# Patient Record
Sex: MALE | Race: BLACK OR AFRICAN AMERICAN | NOT HISPANIC OR LATINO | Employment: OTHER | URBAN - METROPOLITAN AREA
[De-identification: names, ages, dates, MRNs, and addresses within clinical notes are randomized per-mention and may not be internally consistent; named-entity substitution may affect disease eponyms.]

---

## 2018-08-22 ENCOUNTER — HOSPITAL ENCOUNTER (INPATIENT)
Facility: HOSPITAL | Age: 53
LOS: 1 days | Discharge: HOME | End: 2018-08-24
Attending: EMERGENCY MEDICINE | Admitting: HOSPITALIST

## 2018-08-22 DIAGNOSIS — L03.116 CELLULITIS OF LEFT LEG: Primary | ICD-10-CM

## 2018-08-22 LAB
BASOPHILS # BLD AUTO: 0.01 10*3/MM3 (ref 0–0.2)
BASOPHILS NFR BLD AUTO: 0.1 % (ref 0–1.5)
D-LACTATE SERPL-SCNC: 1.1 MMOL/L (ref 0.5–2)
DEPRECATED RDW RBC AUTO: 41.1 FL (ref 37–54)
EOSINOPHIL # BLD AUTO: 0.04 10*3/MM3 (ref 0–0.7)
EOSINOPHIL NFR BLD AUTO: 0.3 % (ref 0.3–6.2)
ERYTHROCYTE [DISTWIDTH] IN BLOOD BY AUTOMATED COUNT: 13.6 % (ref 11.5–14.5)
HCT VFR BLD AUTO: 43.1 % (ref 40.4–52.2)
HGB BLD-MCNC: 14.5 G/DL (ref 13.7–17.6)
IMM GRANULOCYTES # BLD: 0.02 10*3/MM3 (ref 0–0.03)
IMM GRANULOCYTES NFR BLD: 0.2 % (ref 0–0.5)
LYMPHOCYTES # BLD AUTO: 1.35 10*3/MM3 (ref 0.9–4.8)
LYMPHOCYTES NFR BLD AUTO: 10.6 % (ref 19.6–45.3)
MCH RBC QN AUTO: 27.7 PG (ref 27–32.7)
MCHC RBC AUTO-ENTMCNC: 33.6 G/DL (ref 32.6–36.4)
MCV RBC AUTO: 82.3 FL (ref 79.8–96.2)
MONOCYTES # BLD AUTO: 0.59 10*3/MM3 (ref 0.2–1.2)
MONOCYTES NFR BLD AUTO: 4.6 % (ref 5–12)
NEUTROPHILS # BLD AUTO: 10.77 10*3/MM3 (ref 1.9–8.1)
NEUTROPHILS NFR BLD AUTO: 84.4 % (ref 42.7–76)
PLATELET # BLD AUTO: 188 10*3/MM3 (ref 140–500)
PMV BLD AUTO: 10.1 FL (ref 6–12)
RBC # BLD AUTO: 5.24 10*6/MM3 (ref 4.6–6)
WBC NRBC COR # BLD: 12.76 10*3/MM3 (ref 4.5–10.7)

## 2018-08-22 PROCEDURE — 85652 RBC SED RATE AUTOMATED: CPT | Performed by: EMERGENCY MEDICINE

## 2018-08-22 PROCEDURE — 36415 COLL VENOUS BLD VENIPUNCTURE: CPT

## 2018-08-22 PROCEDURE — 83036 HEMOGLOBIN GLYCOSYLATED A1C: CPT | Performed by: INTERNAL MEDICINE

## 2018-08-22 PROCEDURE — 25010000002 VANCOMYCIN 10 G RECONSTITUTED SOLUTION: Performed by: EMERGENCY MEDICINE

## 2018-08-22 PROCEDURE — 87040 BLOOD CULTURE FOR BACTERIA: CPT | Performed by: EMERGENCY MEDICINE

## 2018-08-22 PROCEDURE — 99284 EMERGENCY DEPT VISIT MOD MDM: CPT

## 2018-08-22 PROCEDURE — 85025 COMPLETE CBC W/AUTO DIFF WBC: CPT | Performed by: EMERGENCY MEDICINE

## 2018-08-22 PROCEDURE — 25010000002 CEFTRIAXONE PER 250 MG: Performed by: EMERGENCY MEDICINE

## 2018-08-22 PROCEDURE — 86140 C-REACTIVE PROTEIN: CPT | Performed by: EMERGENCY MEDICINE

## 2018-08-22 PROCEDURE — 84550 ASSAY OF BLOOD/URIC ACID: CPT | Performed by: EMERGENCY MEDICINE

## 2018-08-22 PROCEDURE — 83605 ASSAY OF LACTIC ACID: CPT | Performed by: EMERGENCY MEDICINE

## 2018-08-22 PROCEDURE — 80053 COMPREHEN METABOLIC PANEL: CPT | Performed by: EMERGENCY MEDICINE

## 2018-08-22 RX ORDER — HYDROCHLOROTHIAZIDE 12.5 MG/1
12.5 CAPSULE, GELATIN COATED ORAL DAILY
COMMUNITY
End: 2018-08-24 | Stop reason: HOSPADM

## 2018-08-22 RX ORDER — ACETAMINOPHEN 500 MG
1000 TABLET ORAL ONCE
Status: COMPLETED | OUTPATIENT
Start: 2018-08-22 | End: 2018-08-22

## 2018-08-22 RX ORDER — AMLODIPINE BESYLATE 10 MG/1
10 TABLET ORAL DAILY
COMMUNITY

## 2018-08-22 RX ORDER — OMEPRAZOLE 20 MG/1
20 CAPSULE, DELAYED RELEASE ORAL DAILY
COMMUNITY

## 2018-08-22 RX ORDER — POTASSIUM CHLORIDE 750 MG/1
10 CAPSULE, EXTENDED RELEASE ORAL ONCE
COMMUNITY
End: 2018-08-24 | Stop reason: HOSPADM

## 2018-08-22 RX ORDER — GABAPENTIN 600 MG/1
600 TABLET ORAL 3 TIMES DAILY
COMMUNITY

## 2018-08-22 RX ORDER — OXYCODONE AND ACETAMINOPHEN 7.5; 325 MG/1; MG/1
1 TABLET ORAL EVERY 6 HOURS PRN
COMMUNITY
End: 2018-08-24 | Stop reason: HOSPADM

## 2018-08-22 RX ORDER — CEFTRIAXONE SODIUM 1 G/50ML
1 INJECTION, SOLUTION INTRAVENOUS ONCE
Status: COMPLETED | OUTPATIENT
Start: 2018-08-22 | End: 2018-08-22

## 2018-08-22 RX ORDER — ATENOLOL 25 MG/1
25 TABLET ORAL DAILY
COMMUNITY

## 2018-08-22 RX ORDER — ONDANSETRON 4 MG/1
4 TABLET, FILM COATED ORAL EVERY 8 HOURS PRN
COMMUNITY
End: 2018-08-24 | Stop reason: HOSPADM

## 2018-08-22 RX ADMIN — ACETAMINOPHEN 1000 MG: 500 TABLET, FILM COATED ORAL at 23:33

## 2018-08-22 RX ADMIN — VANCOMYCIN HYDROCHLORIDE 1750 MG: 10 INJECTION, POWDER, LYOPHILIZED, FOR SOLUTION INTRAVENOUS at 23:42

## 2018-08-22 RX ADMIN — SODIUM CHLORIDE 1000 ML: 9 INJECTION, SOLUTION INTRAVENOUS at 23:27

## 2018-08-22 RX ADMIN — CEFTRIAXONE SODIUM 1 G: 1 INJECTION, SOLUTION INTRAVENOUS at 23:33

## 2018-08-23 PROBLEM — I10 ESSENTIAL HYPERTENSION: Status: ACTIVE | Noted: 2018-08-23

## 2018-08-23 PROBLEM — E87.6 HYPOKALEMIA: Status: ACTIVE | Noted: 2018-08-23

## 2018-08-23 PROBLEM — M54.9 CHRONIC BACK PAIN: Status: ACTIVE | Noted: 2018-08-23

## 2018-08-23 PROBLEM — A41.9 SEPSIS (HCC): Status: ACTIVE | Noted: 2018-08-23

## 2018-08-23 PROBLEM — R73.9 HYPERGLYCEMIA: Status: ACTIVE | Noted: 2018-08-23

## 2018-08-23 PROBLEM — G89.29 CHRONIC BACK PAIN: Status: ACTIVE | Noted: 2018-08-23

## 2018-08-23 PROBLEM — L03.116 CELLULITIS OF LEFT LEG: Status: ACTIVE | Noted: 2018-08-23

## 2018-08-23 LAB
ALBUMIN SERPL-MCNC: 3.9 G/DL (ref 3.5–5.2)
ALBUMIN/GLOB SERPL: 1.1 G/DL
ALP SERPL-CCNC: 84 U/L (ref 39–117)
ALT SERPL W P-5'-P-CCNC: 26 U/L (ref 1–41)
ANION GAP SERPL CALCULATED.3IONS-SCNC: 8.5 MMOL/L
ANION GAP SERPL CALCULATED.3IONS-SCNC: 9.1 MMOL/L
AST SERPL-CCNC: 20 U/L (ref 1–40)
BASOPHILS # BLD AUTO: 0.01 10*3/MM3 (ref 0–0.2)
BASOPHILS NFR BLD AUTO: 0.1 % (ref 0–1.5)
BILIRUB SERPL-MCNC: 0.7 MG/DL (ref 0.1–1.2)
BUN BLD-MCNC: 12 MG/DL (ref 6–20)
BUN BLD-MCNC: 16 MG/DL (ref 6–20)
BUN/CREAT SERPL: 10.1 (ref 7–25)
BUN/CREAT SERPL: 11.6 (ref 7–25)
CALCIUM SPEC-SCNC: 8.3 MG/DL (ref 8.6–10.5)
CALCIUM SPEC-SCNC: 9.4 MG/DL (ref 8.6–10.5)
CHLORIDE SERPL-SCNC: 100 MMOL/L (ref 98–107)
CHLORIDE SERPL-SCNC: 104 MMOL/L (ref 98–107)
CO2 SERPL-SCNC: 25.9 MMOL/L (ref 22–29)
CO2 SERPL-SCNC: 29.5 MMOL/L (ref 22–29)
CREAT BLD-MCNC: 1.19 MG/DL (ref 0.76–1.27)
CREAT BLD-MCNC: 1.38 MG/DL (ref 0.76–1.27)
CRP SERPL-MCNC: 22.42 MG/DL (ref 0–0.5)
DEPRECATED RDW RBC AUTO: 42.5 FL (ref 37–54)
EOSINOPHIL # BLD AUTO: 0.11 10*3/MM3 (ref 0–0.7)
EOSINOPHIL NFR BLD AUTO: 0.9 % (ref 0.3–6.2)
ERYTHROCYTE [DISTWIDTH] IN BLOOD BY AUTOMATED COUNT: 13.8 % (ref 11.5–14.5)
ERYTHROCYTE [SEDIMENTATION RATE] IN BLOOD: 25 MM/HR (ref 0–20)
GFR SERPL CREATININE-BSD FRML MDRD: 66 ML/MIN/1.73
GFR SERPL CREATININE-BSD FRML MDRD: 78 ML/MIN/1.73
GLOBULIN UR ELPH-MCNC: 3.7 GM/DL
GLUCOSE BLD-MCNC: 128 MG/DL (ref 65–99)
GLUCOSE BLD-MCNC: 158 MG/DL (ref 65–99)
HBA1C MFR BLD: 6 % (ref 4.8–5.6)
HCT VFR BLD AUTO: 44.8 % (ref 40.4–52.2)
HGB BLD-MCNC: 14.5 G/DL (ref 13.7–17.6)
IMM GRANULOCYTES # BLD: 0 10*3/MM3 (ref 0–0.03)
IMM GRANULOCYTES NFR BLD: 0 % (ref 0–0.5)
LYMPHOCYTES # BLD AUTO: 1.38 10*3/MM3 (ref 0.9–4.8)
LYMPHOCYTES NFR BLD AUTO: 11.8 % (ref 19.6–45.3)
MCH RBC QN AUTO: 27.2 PG (ref 27–32.7)
MCHC RBC AUTO-ENTMCNC: 32.4 G/DL (ref 32.6–36.4)
MCV RBC AUTO: 83.9 FL (ref 79.8–96.2)
MONOCYTES # BLD AUTO: 0.6 10*3/MM3 (ref 0.2–1.2)
MONOCYTES NFR BLD AUTO: 5.1 % (ref 5–12)
NEUTROPHILS # BLD AUTO: 9.56 10*3/MM3 (ref 1.9–8.1)
NEUTROPHILS NFR BLD AUTO: 82.1 % (ref 42.7–76)
PLATELET # BLD AUTO: 189 10*3/MM3 (ref 140–500)
PMV BLD AUTO: 10.6 FL (ref 6–12)
POTASSIUM BLD-SCNC: 3.2 MMOL/L (ref 3.5–5.2)
POTASSIUM BLD-SCNC: 3.2 MMOL/L (ref 3.5–5.2)
PROT SERPL-MCNC: 7.6 G/DL (ref 6–8.5)
RBC # BLD AUTO: 5.34 10*6/MM3 (ref 4.6–6)
SODIUM BLD-SCNC: 138 MMOL/L (ref 136–145)
SODIUM BLD-SCNC: 139 MMOL/L (ref 136–145)
URATE SERPL-MCNC: 6.2 MG/DL (ref 3.4–7)
WBC NRBC COR # BLD: 11.66 10*3/MM3 (ref 4.5–10.7)

## 2018-08-23 PROCEDURE — 25010000002 VANCOMYCIN 10 G RECONSTITUTED SOLUTION: Performed by: INTERNAL MEDICINE

## 2018-08-23 PROCEDURE — 80048 BASIC METABOLIC PNL TOTAL CA: CPT | Performed by: INTERNAL MEDICINE

## 2018-08-23 PROCEDURE — 85025 COMPLETE CBC W/AUTO DIFF WBC: CPT | Performed by: INTERNAL MEDICINE

## 2018-08-23 PROCEDURE — 25010000003 CEFTRIAXONE PER 250 MG: Performed by: INTERNAL MEDICINE

## 2018-08-23 PROCEDURE — 25810000003 SODIUM CHLORIDE 0.9 % WITH KCL 20 MEQ 20-0.9 MEQ/L-% SOLUTION: Performed by: INTERNAL MEDICINE

## 2018-08-23 RX ORDER — ONDANSETRON 2 MG/ML
4 INJECTION INTRAMUSCULAR; INTRAVENOUS EVERY 6 HOURS PRN
Status: DISCONTINUED | OUTPATIENT
Start: 2018-08-23 | End: 2018-08-24 | Stop reason: HOSPADM

## 2018-08-23 RX ORDER — ATENOLOL 25 MG/1
25 TABLET ORAL DAILY
Status: DISCONTINUED | OUTPATIENT
Start: 2018-08-23 | End: 2018-08-24 | Stop reason: HOSPADM

## 2018-08-23 RX ORDER — ACETAMINOPHEN 325 MG/1
650 TABLET ORAL EVERY 4 HOURS PRN
Status: DISCONTINUED | OUTPATIENT
Start: 2018-08-23 | End: 2018-08-24 | Stop reason: HOSPADM

## 2018-08-23 RX ORDER — SODIUM CHLORIDE 9 MG/ML
100 INJECTION, SOLUTION INTRAVENOUS CONTINUOUS
Status: DISCONTINUED | OUTPATIENT
Start: 2018-08-23 | End: 2018-08-23

## 2018-08-23 RX ORDER — CEFTRIAXONE SODIUM 2 G/50ML
2 INJECTION, SOLUTION INTRAVENOUS EVERY 24 HOURS
Status: DISCONTINUED | OUTPATIENT
Start: 2018-08-23 | End: 2018-08-24

## 2018-08-23 RX ORDER — POTASSIUM CHLORIDE 750 MG/1
40 CAPSULE, EXTENDED RELEASE ORAL AS NEEDED
Status: DISCONTINUED | OUTPATIENT
Start: 2018-08-23 | End: 2018-08-24 | Stop reason: HOSPADM

## 2018-08-23 RX ORDER — BISACODYL 5 MG/1
5 TABLET, DELAYED RELEASE ORAL DAILY PRN
Status: DISCONTINUED | OUTPATIENT
Start: 2018-08-23 | End: 2018-08-24 | Stop reason: HOSPADM

## 2018-08-23 RX ORDER — AMLODIPINE BESYLATE 10 MG/1
10 TABLET ORAL DAILY
Status: DISCONTINUED | OUTPATIENT
Start: 2018-08-23 | End: 2018-08-24 | Stop reason: HOSPADM

## 2018-08-23 RX ORDER — SODIUM CHLORIDE AND POTASSIUM CHLORIDE 150; 900 MG/100ML; MG/100ML
100 INJECTION, SOLUTION INTRAVENOUS CONTINUOUS
Status: DISCONTINUED | OUTPATIENT
Start: 2018-08-23 | End: 2018-08-24 | Stop reason: HOSPADM

## 2018-08-23 RX ORDER — HYDROCODONE BITARTRATE AND ACETAMINOPHEN 5; 325 MG/1; MG/1
1 TABLET ORAL EVERY 4 HOURS PRN
Status: DISCONTINUED | OUTPATIENT
Start: 2018-08-23 | End: 2018-08-24 | Stop reason: HOSPADM

## 2018-08-23 RX ORDER — ONDANSETRON 4 MG/1
4 TABLET, ORALLY DISINTEGRATING ORAL EVERY 6 HOURS PRN
Status: DISCONTINUED | OUTPATIENT
Start: 2018-08-23 | End: 2018-08-24 | Stop reason: HOSPADM

## 2018-08-23 RX ORDER — ONDANSETRON 4 MG/1
4 TABLET, FILM COATED ORAL EVERY 6 HOURS PRN
Status: DISCONTINUED | OUTPATIENT
Start: 2018-08-23 | End: 2018-08-24 | Stop reason: HOSPADM

## 2018-08-23 RX ORDER — OXYCODONE AND ACETAMINOPHEN 7.5; 325 MG/1; MG/1
1 TABLET ORAL EVERY 6 HOURS PRN
Status: DISCONTINUED | OUTPATIENT
Start: 2018-08-23 | End: 2018-08-24 | Stop reason: HOSPADM

## 2018-08-23 RX ORDER — SODIUM CHLORIDE 0.9 % (FLUSH) 0.9 %
1-10 SYRINGE (ML) INJECTION AS NEEDED
Status: DISCONTINUED | OUTPATIENT
Start: 2018-08-23 | End: 2018-08-24 | Stop reason: HOSPADM

## 2018-08-23 RX ORDER — GABAPENTIN 300 MG/1
600 CAPSULE ORAL EVERY 8 HOURS SCHEDULED
Status: DISCONTINUED | OUTPATIENT
Start: 2018-08-23 | End: 2018-08-24 | Stop reason: HOSPADM

## 2018-08-23 RX ORDER — POTASSIUM CHLORIDE 1.5 G/1.77G
40 POWDER, FOR SOLUTION ORAL AS NEEDED
Status: DISCONTINUED | OUTPATIENT
Start: 2018-08-23 | End: 2018-08-24 | Stop reason: HOSPADM

## 2018-08-23 RX ORDER — POTASSIUM CHLORIDE 7.45 MG/ML
10 INJECTION INTRAVENOUS
Status: DISCONTINUED | OUTPATIENT
Start: 2018-08-23 | End: 2018-08-24 | Stop reason: HOSPADM

## 2018-08-23 RX ORDER — PANTOPRAZOLE SODIUM 40 MG/1
40 TABLET, DELAYED RELEASE ORAL
Status: DISCONTINUED | OUTPATIENT
Start: 2018-08-23 | End: 2018-08-24 | Stop reason: HOSPADM

## 2018-08-23 RX ORDER — CALCIUM CARBONATE 200(500)MG
2 TABLET,CHEWABLE ORAL 2 TIMES DAILY PRN
Status: DISCONTINUED | OUTPATIENT
Start: 2018-08-23 | End: 2018-08-24 | Stop reason: HOSPADM

## 2018-08-23 RX ADMIN — HYDROCODONE BITARTRATE AND ACETAMINOPHEN 1 TABLET: 5; 325 TABLET ORAL at 06:42

## 2018-08-23 RX ADMIN — SODIUM CHLORIDE 100 ML/HR: 9 INJECTION, SOLUTION INTRAVENOUS at 02:56

## 2018-08-23 RX ADMIN — POTASSIUM CHLORIDE AND SODIUM CHLORIDE 100 ML/HR: 900; 150 INJECTION, SOLUTION INTRAVENOUS at 06:45

## 2018-08-23 RX ADMIN — VANCOMYCIN HYDROCHLORIDE 1750 MG: 10 INJECTION, POWDER, LYOPHILIZED, FOR SOLUTION INTRAVENOUS at 22:28

## 2018-08-23 RX ADMIN — GABAPENTIN 600 MG: 300 CAPSULE ORAL at 22:25

## 2018-08-23 RX ADMIN — CEFTRIAXONE SODIUM 2 G: 2 INJECTION, SOLUTION INTRAVENOUS at 11:23

## 2018-08-23 RX ADMIN — POTASSIUM CHLORIDE 40 MEQ: 750 CAPSULE, EXTENDED RELEASE ORAL at 06:42

## 2018-08-23 RX ADMIN — AMLODIPINE BESYLATE 10 MG: 10 TABLET ORAL at 08:56

## 2018-08-23 RX ADMIN — ATENOLOL 25 MG: 25 TABLET ORAL at 08:56

## 2018-08-23 RX ADMIN — GABAPENTIN 600 MG: 300 CAPSULE ORAL at 14:09

## 2018-08-23 RX ADMIN — VANCOMYCIN HYDROCHLORIDE 1750 MG: 10 INJECTION, POWDER, LYOPHILIZED, FOR SOLUTION INTRAVENOUS at 11:09

## 2018-08-23 RX ADMIN — GABAPENTIN 600 MG: 300 CAPSULE ORAL at 06:43

## 2018-08-23 RX ADMIN — PANTOPRAZOLE SODIUM 40 MG: 40 TABLET, DELAYED RELEASE ORAL at 06:43

## 2018-08-23 NOTE — PLAN OF CARE
Problem: Patient Care Overview  Goal: Plan of Care Review  Outcome: Ongoing (interventions implemented as appropriate)   08/23/18 035   Coping/Psychosocial   Plan of Care Reviewed With patient   Plan of Care Review   Progress no change   OTHER   Outcome Summary admitted from ED w/ pain in LLE from cellulitis. started on vanc and rocephin. IVF per MAR. await AM labs. LLE remains red and hot to touch. will continue to monitor.        Problem: Pain, Acute (Adult)  Goal: Acceptable Pain Control/Comfort Level  Outcome: Ongoing (interventions implemented as appropriate)      Problem: Skin and Soft Tissue Infection (Adult)  Goal: Signs and Symptoms of Listed Potential Problems Will be Absent, Minimized or Managed (Skin and Soft Tissue Infection)  Outcome: Ongoing (interventions implemented as appropriate)

## 2018-08-23 NOTE — PROGRESS NOTES
"Pharmacokinetic Evaluation - Vancomycin    Kike Prajapati is a 53 y.o. male on vancomycin pharmacy to dose.  MRN: [id number]  : 1965    Day of vancomycin therapy: 1  Indication: SSTI  Consulted by: Dr. Ley  Goal trough: 15-20mg/L (possible joint infection until r/o)    Other antimicrobials: CTX 2g q24h    Blood pressure 111/62, pulse 71, temperature 97.9 °F (36.6 °C), temperature source Oral, resp. rate 16, height 180.3 cm (70.98\"), weight 92.9 kg (204 lb 12.8 oz), SpO2 99 %.    Results from last 7 days  Lab Units 18  0549 18  2325   CREATININE mg/dL 1.19 1.38*     Estimated Creatinine Clearance: 84.5 mL/min (by C-G formula based on SCr of 1.19 mg/dL).    Results from last 7 days  Lab Units 18  0549 18  2325   WBC 10*3/mm3 11.66* 12.76*   HEMOGLOBIN g/dL 14.5 14.5   HEMATOCRIT % 44.8 43.1   PLATELETS 10*3/mm3 189 188             Other relevant labs/chart info: CRP: 22.4; from H&P- knee is tender and plan to consult ortho to r/o possibility of septic joint  Cultures:    bcx: In process    Dosing hx (include troughs if drawn):  Vancomycin 1750mg loading dose    2342    Assessment:  Plan was to get a 12 hr level due to lack of information at time of consult. crcl is 84 this morning. Scr improved. Given age, weight and renal fx I feel comfortable in holding the random level and starting schedule dosing. Will check a trough on  @ 1030 after 3 total doses. Will be slightly before steady state but will be able to decided if a dose adjustment is needed.    Plan:  1) Start vancomycin 1750 mg every 12 hours.  2) Next trough on  at 1030 after 3 total doses.  3) Monitor for uop and scr.    Jalil Llanos [facility]    "

## 2018-08-23 NOTE — PROGRESS NOTES
"Pharmacokinetic Consult - Vancomycin Dosing (Initial Note)    Kike Prajapati is a 53 y.o. male 180.3 cm (70.98\") 92.9 kg (204 lb 12.8 oz)   Pharmacy consulted to dose vancomycin for SSTI.  Pharmacy dosing vancomycin per Dr. Ley's request.   Goal trough: 10 - 20 mcg/mL (per indication, although do not know if more serious SSTI)  Additional Abx Therapy: Ceftriaxone 2 g IV Q24H    Anti-Infectives     Ordered     Dose/Rate Route Frequency Start Stop    08/23/18 0238  Vancomycin Pharmacy Intermittent Dosing     Ordering Provider:  Gene Ley MD     Does not apply Daily 08/23/18 0900 09/02/18 0859    08/23/18 0224  cefTRIAXone (ROCEPHIN) IVPB 2 g     Ordering Provider:  Gene Ley MD    2 g  100 mL/hr over 30 Minutes Intravenous Every 24 Hours 08/23/18 0315 09/02/18 0314    08/23/18 0224  Pharmacy to dose vancomycin     Ordering Provider:  Gene Ley MD     Does not apply Continuous PRN 08/23/18 0224 09/02/18 0223    08/22/18 2312  cefTRIAXone (ROCEPHIN) IVPB 1 g     Ordering Provider:  Maxwell Shultz MD    1 g  100 mL/hr over 30 Minutes Intravenous Once 08/22/18 2314 08/22/18 2358    08/22/18 2312  vancomycin 1750 mg/500 mL 0.9% NS IVPB ([facility])     Ordering Provider:  Maxwell Shultz MD    20 mg/kg × 92.3 kg Intravenous Once 08/22/18 2314 08/22/18 2342           Relevant clinical data and objective history reviewed:  -Unable to review history given new patient and no notes    Lab Results   Component Value Date    CREATININE 1.38 (H) 08/22/2018     Estimated Creatinine Clearance: 72.9 mL/min (A) (by C-G formula based on SCr of 1.38 mg/dL (H)).    Lab Results   Component Value Date    WBC 12.76 (H) 08/22/2018     Temp Readings from Last 1 Encounters:   08/23/18 98.2 °F (36.8 °C) (Oral)       Baseline culture/source/susceptibility:   8/22 Blood Cx (2/2): In process  8/22 WBC: 12.76  8/22 ESR/CRP: 25/22.42    Assessment/Plan  Given the lack of patient history, elevated SCr, and weight, will start " Vancomycin Intermittent Dosing and schedule a vancomycin level to be due @1100 on 8/23 (approximately 12 hour level). If therapeutic or subtherapeutic, recommend starting vancomycin 1750 mg IV Q12H. Will monitor serum creatinine and vancomycin levels and adjust as needed.Thank you Dr. Ley for this consult.     Andres Bowling, PharmD  08/23/18 2:39 AM

## 2018-08-23 NOTE — ED TRIAGE NOTES
Pt limping in triage. Offered a wheelchair several times by multiple staff members and refused. Pt walked to room.
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
## 2018-08-23 NOTE — PAYOR COMM NOTE
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 As Needed for Nausea or Vomiting. - Oral    Linked Group 1:  \"Or\" Linked Group Details        oxyCODONE-acetaminophen (PERCOCET) 7.5-325 MG per tablet 1 tablet 1 tablet Every 6 Hours PRN 8/23/2018     Sig - Route: Take 1 tablet by mouth Every 6 (Six) Hours As Needed for Moderate Pain . - Oral    pantoprazole (PROTONIX) EC tablet 40 mg 40 mg Every Early Morning 8/23/2018     Sig - Route: Take 1 tablet by mouth Every Morning. - Oral    Pharmacy to dose vancomycin  Continuous PRN 8/23/2018 9/2/2018    Sig - Route: Continuous As Needed for Consult. - Does not apply    potassium chloride (KLOR-CON) packet 40 mEq 40 mEq As Needed 8/23/2018     Sig - Route: Take 40 mEq by mouth As Needed (potassium replacement, see admin instructions). - Oral    Linked Group 2:  \"Or\" Linked Group Details        potassium chloride (MICRO-K) CR capsule 40 mEq 40 mEq As Needed 8/23/2018     Sig - Route: Take 4 capsules by mouth As Needed (potassium replacement.  see admin instructions). - Oral    Linked Group 2:  \"Or\" Linked Group Details        potassium chloride 10 mEq in 100 mL IVPB 10 mEq Every 1 Hour PRN 8/23/2018     Sig - " "Route: Infuse 100 mL into a venous catheter Every 1 (One) Hour As Needed (potassium protocol PERIPHERAL - see admin instructions). - Intravenous    Linked Group 2:  \"Or\" Linked Group Details        sodium chloride 0.9 % bolus 1,000 mL 1,000 mL Once 8/22/2018 8/22/2018    Sig - Route: Infuse 1,000 mL into a venous catheter 1 (One) Time. - Intravenous    sodium chloride 0.9 % flush 1-10 mL 1-10 mL As Needed 8/23/2018     Sig - Route: Infuse 1-10 mL into a venous catheter As Needed for Line Care. - Intravenous    sodium chloride 0.9 % with KCl 20 mEq/L infusion 100 mL/hr Continuous 8/23/2018     Sig - Route: Infuse 100 mL/hr into a venous catheter Continuous. - Intravenous    vancomycin 1750 mg/500 mL 0.9% NS IVPB ([facility]) 20 mg/kg × 92.3 kg Once 8/22/2018 8/22/2018    Sig - Route: Infuse 500 mL into a venous catheter 1 (One) Time. - Intravenous    vancomycin 1750 mg/500 mL 0.9% NS IVPB ([facility]) 1,750 mg Every 12 Hours 8/23/2018 9/2/2018    Sig - Route: Infuse 500 mL into a venous catheter Every 12 (Twelve) Hours. - Intravenous    sodium chloride 0.9 % infusion (Discontinued) 100 mL/hr Continuous 8/23/2018 8/23/2018    Sig - Route: Infuse 100 mL/hr into a venous catheter Continuous. - Intravenous    Vancomycin Pharmacy Intermittent Dosing (Discontinued)  Daily 8/23/2018 8/23/2018    Sig - Route: Daily. - Does not apply            Lab Results (last 24 hours)     Procedure Component Value Units Date/Time    Blood Culture - Blood, Blood, Venous Line [[id number]]  (Normal) Collected:  08/22/18 2326    Specimen:  Blood from Arm, Left Updated:  08/23/18 1145     Blood Culture No growth at less than 24 hours    Blood Culture - Blood, Blood, Venous Line [[id number]]  (Normal) Collected:  08/22/18 2325    Specimen:  Blood from Arm, Left Updated:  08/23/18 1145     Blood Culture No growth at less than 24 hours    Basic Metabolic Panel [[id number]]  (Abnormal) Collected:  08/23/18 0549    Specimen:  Blood Updated:  08/23/18 0721     " Glucose 158 (H) mg/dL      BUN 12 mg/dL      Creatinine 1.19 mg/dL      Sodium 139 mmol/L      Potassium 3.2 (L) mmol/L      Chloride 104 mmol/L      CO2 25.9 mmol/L      Calcium 8.3 (L) mg/dL      eGFR  African Amer 78 mL/min/1.73      BUN/Creatinine Ratio 10.1     Anion Gap 9.1 mmol/L     Narrative:       GFR Normal >60  Chronic Kidney Disease <60  Kidney Failure <15    CBC & Differential [[id number]] Collected:  08/23/18 0549    Specimen:  Blood Updated:  08/23/18 0647    Narrative:       The following orders were created for panel order CBC & Differential.  Procedure                               Abnormality         Status                     ---------                               -----------         ------                     CBC Auto Differential[[id number]]        Abnormal            Final result                 Please view results for these tests on the individual orders.    CBC Auto Differential [[id number]]  (Abnormal) Collected:  08/23/18 0549    Specimen:  Blood Updated:  08/23/18 0647     WBC 11.66 (H) 10*3/mm3      RBC 5.34 10*6/mm3      Hemoglobin 14.5 g/dL      Hematocrit 44.8 %      MCV 83.9 fL      MCH 27.2 pg      MCHC 32.4 (L) g/dL      RDW 13.8 %      RDW-SD 42.5 fl      MPV 10.6 fL      Platelets 189 10*3/mm3      Neutrophil % 82.1 (H) %      Lymphocyte % 11.8 (L) %      Monocyte % 5.1 %      Eosinophil % 0.9 %      Basophil % 0.1 %      Immature Grans % 0.0 %      Neutrophils, Absolute 9.56 (H) 10*3/mm3      Lymphocytes, Absolute 1.38 10*3/mm3      Monocytes, Absolute 0.60 10*3/mm3      Eosinophils, Absolute 0.11 10*3/mm3      Basophils, Absolute 0.01 10*3/mm3      Immature Grans, Absolute 0.00 10*3/mm3     Hemoglobin A1c [[id number]]  (Abnormal) Collected:  08/22/18 2325    Specimen:  Blood Updated:  08/23/18 0637     Hemoglobin A1C 6.00 (H) %     Narrative:       Hemoglobin A1C Ranges:    Increased Risk for Diabetes  5.7% to 6.4%  Diabetes                     >= 6.5%  Diabetic Goal                 < 7.0%    Sedimentation Rate [[id number]]  (Abnormal) Collected:  08/22/18 2325    Specimen:  Blood Updated:  08/23/18 0024     Sed Rate 25 (H) mm/hr     Comprehensive Metabolic Panel [[id number]]  (Abnormal) Collected:  08/22/18 2325    Specimen:  Blood Updated:  08/23/18 0002     Glucose 128 (H) mg/dL      BUN 16 mg/dL      Creatinine 1.38 (H) mg/dL      Sodium 138 mmol/L      Potassium 3.2 (L) mmol/L      Chloride 100 mmol/L      CO2 29.5 (H) mmol/L      Calcium 9.4 mg/dL      Total Protein 7.6 g/dL      Albumin 3.90 g/dL      ALT (SGPT) 26 U/L      AST (SGOT) 20 U/L      Alkaline Phosphatase 84 U/L      Total Bilirubin 0.7 mg/dL      eGFR   Amer 66 mL/min/1.73      Globulin 3.7 gm/dL      A/G Ratio 1.1 g/dL      BUN/Creatinine Ratio 11.6     Anion Gap 8.5 mmol/L     C-reactive Protein [[id number]]  (Abnormal) Collected:  08/22/18 2325    Specimen:  Blood Updated:  08/23/18 0002     C-Reactive Protein 22.42 (H) mg/dL     Uric Acid [[id number]]  (Normal) Collected:  08/22/18 2325    Specimen:  Blood Updated:  08/23/18 0002     Uric Acid 6.2 mg/dL     Lactic Acid, Plasma [[id number]]  (Normal) Collected:  08/22/18 2325    Specimen:  Blood Updated:  08/22/18 2354     Lactate 1.1 mmol/L     CBC & Differential [[id number]] Collected:  08/22/18 2325    Specimen:  Blood Updated:  08/22/18 2340    Narrative:       The following orders were created for panel order CBC & Differential.  Procedure                               Abnormality         Status                     ---------                               -----------         ------                     CBC Auto Differential[[id number]]        Abnormal            Final result                 Please view results for these tests on the individual orders.    CBC Auto Differential [[id number]]  (Abnormal) Collected:  08/22/18 2325    Specimen:  Blood Updated:  08/22/18 2340     WBC 12.76 (H) 10*3/mm3      RBC 5.24 10*6/mm3      Hemoglobin 14.5 g/dL      Hematocrit 43.1 %       MCV 82.3 fL      MCH 27.7 pg      MCHC 33.6 g/dL      RDW 13.6 %      RDW-SD 41.1 fl      MPV 10.1 fL      Platelets 188 10*3/mm3      Neutrophil % 84.4 (H) %      Lymphocyte % 10.6 (L) %      Monocyte % 4.6 (L) %      Eosinophil % 0.3 %      Basophil % 0.1 %      Immature Grans % 0.2 %      Neutrophils, Absolute 10.77 (H) 10*3/mm3      Lymphocytes, Absolute 1.35 10*3/mm3      Monocytes, Absolute 0.59 10*3/mm3      Eosinophils, Absolute 0.04 10*3/mm3      Basophils, Absolute 0.01 10*3/mm3      Immature Grans, Absolute 0.02 10*3/mm3         Imaging Results (last 24 hours)     ** No results found for the last 24 hours. **        Physician Progress Notes (last 24 hours) (Notes from 8/22/2018 12:39 PM through 8/23/2018 12:39 PM)     No notes of this type exist for this encounter.        Consult Notes (last 24 hours) (Notes from 8/22/2018 12:39 PM through 8/23/2018 12:39 PM)     No notes of this type exist for this encounter.

## 2018-08-23 NOTE — PROGRESS NOTES
Discharge Planning Assessment  [facility]     Patient Name: Kike Prajapati  MRN: [id number]  Today's Date: 8/23/2018    Admit Date: 8/22/2018          Discharge Needs Assessment     Row Name 08/23/18 1542       Discharge Needs Assessment    Concerns to be Addressed no discharge needs identified    Equipment Currently Used at Home none    Row Name 08/23/18 1539       Living Environment    Lives With alone    Current Living Arrangements home/apartment/condo    Primary Care Provided by self    Family Caregiver if Needed child(sudhir), adult    Quality of Family Relationships supportive;helpful       Resource/Environmental Concerns    Transportation Concerns car, none       Transition Planning    Patient/Family Anticipates Transition to home    Patient/Family Anticipated Services at Transition none    Transportation Anticipated car, drives self       Discharge Needs Assessment    Readmission Within the Last 30 Days no previous admission in last 30 days    Concerns to be Addressed no discharge needs identified    Equipment Currently Used at Home none    Equipment Needed After Discharge none            Discharge Plan     Row Name 08/23/18 1628       Plan    Plan Stay with daughter then return home to [place]     Plan Comments Spoke with patient at bedside, face sheet verified. Patient is independent with ADL's. Patient stated he was visiting his daughter Yvonne [phone] from [place] and arrived here via  plane. Patient plans to stay in [place] with is daughter when he is discharged then  return home to [place]. Patient stated his doctor is Nicholas Gutierrez MD family medicine [address] [place] [address] [address].  Paola Ghotra, AGUSTIN        Destination     No service coordination in this encounter.      Durable Medical Equipment     No service coordination in this encounter.      Dialysis/Infusion     No service coordination in this encounter.      Home Medical Care     No service coordination in this  encounter.      Social Care     No service coordination in this encounter.                Demographic Summary     Row Name 08/23/18 1539       General Information    Admission Type inpatient    Arrived From emergency department    Referral Source admission list    Reason for Consult discharge planning    Preferred Language English            Functional Status     Row Name 08/23/18 1539       Functional Status    Usual Activity Tolerance good    Current Activity Tolerance moderate       Functional Status, IADL    Medications independent    Meal Preparation independent    Housekeeping independent    Laundry independent    Shopping independent            Psychosocial    No documentation.           Abuse/Neglect    No documentation.           Legal    No documentation.           Substance Abuse    No documentation.           Patient Forms    No documentation.         Paola Ghotra RN

## 2018-08-23 NOTE — PLAN OF CARE
Problem: Patient Care Overview  Goal: Plan of Care Review  Outcome: Ongoing (interventions implemented as appropriate)   08/23/18 1324   Coping/Psychosocial   Plan of Care Reviewed With patient   Plan of Care Review   Progress improving   OTHER   Outcome Summary No c/o pain thus far this shift.. Up ad gaby, ambulating in room. IV vanc and rocephin given per orders. Will continue to monitor.      Goal: Individualization and Mutuality  Outcome: Ongoing (interventions implemented as appropriate)    Goal: Discharge Needs Assessment  Outcome: Ongoing (interventions implemented as appropriate)    Goal: Interprofessional Rounds/Family Conf  Outcome: Ongoing (interventions implemented as appropriate)      Problem: Pain, Acute (Adult)  Goal: Identify Related Risk Factors and Signs and Symptoms  Outcome: Outcome(s) achieved Date Met: 08/23/18    Goal: Acceptable Pain Control/Comfort Level  Outcome: Ongoing (interventions implemented as appropriate)      Problem: Skin and Soft Tissue Infection (Adult)  Goal: Signs and Symptoms of Listed Potential Problems Will be Absent, Minimized or Managed (Skin and Soft Tissue Infection)  Outcome: Ongoing (interventions implemented as appropriate)

## 2018-08-24 ENCOUNTER — APPOINTMENT (OUTPATIENT)
Dept: GENERAL RADIOLOGY | Facility: HOSPITAL | Age: 53
End: 2018-08-24
Attending: ORTHOPAEDIC SURGERY

## 2018-08-24 VITALS
WEIGHT: 204.8 LBS | DIASTOLIC BLOOD PRESSURE: 86 MMHG | OXYGEN SATURATION: 99 % | RESPIRATION RATE: 16 BRPM | HEIGHT: 71 IN | TEMPERATURE: 98.1 F | HEART RATE: 68 BPM | SYSTOLIC BLOOD PRESSURE: 143 MMHG | BODY MASS INDEX: 28.67 KG/M2

## 2018-08-24 LAB
ANION GAP SERPL CALCULATED.3IONS-SCNC: 11.1 MMOL/L
BASOPHILS # BLD AUTO: 0.01 10*3/MM3 (ref 0–0.2)
BASOPHILS NFR BLD AUTO: 0.1 % (ref 0–1.5)
BUN BLD-MCNC: 9 MG/DL (ref 6–20)
BUN/CREAT SERPL: 7.4 (ref 7–25)
CALCIUM SPEC-SCNC: 9 MG/DL (ref 8.6–10.5)
CHLORIDE SERPL-SCNC: 105 MMOL/L (ref 98–107)
CO2 SERPL-SCNC: 26.9 MMOL/L (ref 22–29)
CREAT BLD-MCNC: 1.21 MG/DL (ref 0.76–1.27)
DEPRECATED RDW RBC AUTO: 41.9 FL (ref 37–54)
EOSINOPHIL # BLD AUTO: 0.21 10*3/MM3 (ref 0–0.7)
EOSINOPHIL NFR BLD AUTO: 2.4 % (ref 0.3–6.2)
ERYTHROCYTE [DISTWIDTH] IN BLOOD BY AUTOMATED COUNT: 13.7 % (ref 11.5–14.5)
GFR SERPL CREATININE-BSD FRML MDRD: 76 ML/MIN/1.73
GLUCOSE BLD-MCNC: 140 MG/DL (ref 65–99)
HCT VFR BLD AUTO: 45 % (ref 40.4–52.2)
HGB BLD-MCNC: 14.8 G/DL (ref 13.7–17.6)
IMM GRANULOCYTES # BLD: 0.02 10*3/MM3 (ref 0–0.03)
IMM GRANULOCYTES NFR BLD: 0.2 % (ref 0–0.5)
LYMPHOCYTES # BLD AUTO: 1.77 10*3/MM3 (ref 0.9–4.8)
LYMPHOCYTES NFR BLD AUTO: 20.4 % (ref 19.6–45.3)
MCH RBC QN AUTO: 27.4 PG (ref 27–32.7)
MCHC RBC AUTO-ENTMCNC: 32.9 G/DL (ref 32.6–36.4)
MCV RBC AUTO: 83.2 FL (ref 79.8–96.2)
MONOCYTES # BLD AUTO: 0.5 10*3/MM3 (ref 0.2–1.2)
MONOCYTES NFR BLD AUTO: 5.8 % (ref 5–12)
NEUTROPHILS # BLD AUTO: 6.2 10*3/MM3 (ref 1.9–8.1)
NEUTROPHILS NFR BLD AUTO: 71.3 % (ref 42.7–76)
PLATELET # BLD AUTO: 212 10*3/MM3 (ref 140–500)
PMV BLD AUTO: 10.7 FL (ref 6–12)
POTASSIUM BLD-SCNC: 3.9 MMOL/L (ref 3.5–5.2)
RBC # BLD AUTO: 5.41 10*6/MM3 (ref 4.6–6)
SODIUM BLD-SCNC: 143 MMOL/L (ref 136–145)
VANCOMYCIN TROUGH SERPL-MCNC: 15.7 MCG/ML (ref 5–20)
WBC NRBC COR # BLD: 8.69 10*3/MM3 (ref 4.5–10.7)

## 2018-08-24 PROCEDURE — 77002 NEEDLE LOCALIZATION BY XRAY: CPT

## 2018-08-24 PROCEDURE — 85025 COMPLETE CBC W/AUTO DIFF WBC: CPT | Performed by: INTERNAL MEDICINE

## 2018-08-24 PROCEDURE — 25010000002 VANCOMYCIN 10 G RECONSTITUTED SOLUTION: Performed by: INTERNAL MEDICINE

## 2018-08-24 PROCEDURE — 0SJD3ZZ INSPECTION OF LEFT KNEE JOINT, PERCUTANEOUS APPROACH: ICD-10-PCS | Performed by: RADIOLOGY

## 2018-08-24 PROCEDURE — 80202 ASSAY OF VANCOMYCIN: CPT | Performed by: INTERNAL MEDICINE

## 2018-08-24 PROCEDURE — 73560 X-RAY EXAM OF KNEE 1 OR 2: CPT

## 2018-08-24 PROCEDURE — 25010000002 IOPAMIDOL 61 % SOLUTION: Performed by: HOSPITALIST

## 2018-08-24 PROCEDURE — 99222 1ST HOSP IP/OBS MODERATE 55: CPT | Performed by: ORTHOPAEDIC SURGERY

## 2018-08-24 PROCEDURE — 80048 BASIC METABOLIC PNL TOTAL CA: CPT | Performed by: INTERNAL MEDICINE

## 2018-08-24 PROCEDURE — 25010000003 CEFTRIAXONE PER 250 MG: Performed by: INTERNAL MEDICINE

## 2018-08-24 PROCEDURE — 25810000003 SODIUM CHLORIDE 0.9 % WITH KCL 20 MEQ 20-0.9 MEQ/L-% SOLUTION: Performed by: INTERNAL MEDICINE

## 2018-08-24 RX ORDER — LIDOCAINE HYDROCHLORIDE 10 MG/ML
10 INJECTION, SOLUTION INFILTRATION; PERINEURAL ONCE
Status: COMPLETED | OUTPATIENT
Start: 2018-08-24 | End: 2018-08-24

## 2018-08-24 RX ORDER — AMOXICILLIN AND CLAVULANATE POTASSIUM 875; 125 MG/1; MG/1
1 TABLET, FILM COATED ORAL EVERY 12 HOURS SCHEDULED
Status: DISCONTINUED | OUTPATIENT
Start: 2018-08-24 | End: 2018-08-24 | Stop reason: HOSPADM

## 2018-08-24 RX ORDER — AMOXICILLIN AND CLAVULANATE POTASSIUM 875; 125 MG/1; MG/1
1 TABLET, FILM COATED ORAL EVERY 12 HOURS SCHEDULED
Qty: 16 TABLET | Refills: 0 | Status: SHIPPED | OUTPATIENT
Start: 2018-08-24 | End: 2018-09-01

## 2018-08-24 RX ORDER — DOXYCYCLINE 100 MG/1
100 CAPSULE ORAL EVERY 12 HOURS SCHEDULED
Status: DISCONTINUED | OUTPATIENT
Start: 2018-08-24 | End: 2018-08-24 | Stop reason: HOSPADM

## 2018-08-24 RX ORDER — DOXYCYCLINE 100 MG/1
100 CAPSULE ORAL EVERY 12 HOURS SCHEDULED
Qty: 16 CAPSULE | Refills: 0 | Status: SHIPPED | OUTPATIENT
Start: 2018-08-24 | End: 2018-09-01

## 2018-08-24 RX ADMIN — POTASSIUM CHLORIDE AND SODIUM CHLORIDE 100 ML/HR: 900; 150 INJECTION, SOLUTION INTRAVENOUS at 06:23

## 2018-08-24 RX ADMIN — LIDOCAINE HYDROCHLORIDE 2 ML: 10 INJECTION, SOLUTION INFILTRATION; PERINEURAL at 14:12

## 2018-08-24 RX ADMIN — PANTOPRAZOLE SODIUM 40 MG: 40 TABLET, DELAYED RELEASE ORAL at 06:23

## 2018-08-24 RX ADMIN — GABAPENTIN 600 MG: 300 CAPSULE ORAL at 14:59

## 2018-08-24 RX ADMIN — ATENOLOL 25 MG: 25 TABLET ORAL at 09:03

## 2018-08-24 RX ADMIN — CEFTRIAXONE SODIUM 2 G: 2 INJECTION, SOLUTION INTRAVENOUS at 10:02

## 2018-08-24 RX ADMIN — AMLODIPINE BESYLATE 10 MG: 10 TABLET ORAL at 09:03

## 2018-08-24 RX ADMIN — GABAPENTIN 600 MG: 300 CAPSULE ORAL at 06:23

## 2018-08-24 RX ADMIN — IOPAMIDOL 2 ML: 612 INJECTION, SOLUTION INTRAVENOUS at 14:20

## 2018-08-24 RX ADMIN — VANCOMYCIN HYDROCHLORIDE 1750 MG: 10 INJECTION, POWDER, LYOPHILIZED, FOR SOLUTION INTRAVENOUS at 11:02

## 2018-08-24 NOTE — DISCHARGE SUMMARY
[facility]  [phone]    DISCHARGE SUMMARY  [facility]    Patient Identification:  Name: Kike Prajapati  Age: 53 y.o.  Sex: male  :  1965  MRN: [id number]  Primary Care Physician: Provider, No Known    Admit date: 2018  Discharge date and time:      Discharge Diagnoses:Active Problems:    Cellulitis of left leg    Essential hypertension    Sepsis (CMS/HCC)    Hypokalemia    Chronic back pain    Hyperglycemia       History of present illness from H&P:    Mr. Prajapati is a 53 y.o. male with a history of HTN and chronic back pain that presents to [facility] complaining of left leg pain which started a couple days ago.  Yesterday his leg began to get erythematous and excessively warm.  He additionally had a lot of pain from bending his left knee.  He denies any open wounds or drainage.  His is a [employer] man and kneels to pray quite a bit.  As a result, his knees are very callused.  He denies previous similar infections or recent antibiotics.  He has no known immune issues, and is not a diabetic.    Hospital Course:     The Patient was admitted to the hospital for cellulitis which improved on vancomycin and rocpehin.  The patient's leg is feeling much better, only minimal warmth and redness.  He was seen by Infectious Disease who helped with the decision on antibiotics for discharge.  He was also seen by orthopedics but there was no knee fluid to be tapped.    LLE Cellulitis with sepsis  - no evidence of drainage or abscess  - on vancomycin and ceftriaxone during the hospitalization and discharge on doxycycline and augmentin  - blood cultures sent  - left knee is quite tender and with small effusion on admission but it was resolved by the time of discharge.     Hypokalemia  - replaced     Chronic Back Pain  - stable  - continue home regimen with percocet and gabapentin     Hyperglycemia  -a1c is high and needs f/u with pmd    Consults:    Consults     Date and Time Order Name Status Description    8/24/2018 1257 Inpatient Infectious Diseases Consult Completed     8/24/2018 0944 Inpatient Orthopedic Surgery Consult      8/23/2018 0011 [facility] (on-call MD unless specified) Completed             Results from last 7 days  Lab Units 08/24/18  0557   WBC 10*3/mm3 8.69   HEMOGLOBIN g/dL 14.8   HEMATOCRIT % 45.0   PLATELETS 10*3/mm3 212         Results from last 7 days  Lab Units 08/24/18  0557   SODIUM mmol/L 143   POTASSIUM mmol/L 3.9   CHLORIDE mmol/L 105   CO2 mmol/L 26.9   BUN mg/dL 9   CREATININE mg/dL 1.21   GLUCOSE mg/dL 140*   CALCIUM mg/dL 9.0       Significant Diagnostic Studies:   Lab Results   Component Value Date    WBC 8.69 08/24/2018    HGB 14.8 08/24/2018    HCT 45.0 08/24/2018     08/24/2018     Lab Results   Component Value Date     08/24/2018    K 3.9 08/24/2018     08/24/2018    CO2 26.9 08/24/2018    BUN 9 08/24/2018    CREATININE 1.21 08/24/2018    GLUCOSE 140 (H) 08/24/2018     Lab Results   Component Value Date    CALCIUM 9.0 08/24/2018     Lab Results   Component Value Date    AST 20 08/22/2018    ALT 26 08/22/2018    ALKPHOS 84 08/22/2018     No results found for: APTT, INR  No results found for: COLORU, CLARITYU, SPECGRAV, PHUR, PROTEINUR, GLUCOSEU, KETONESU, BLOODU, NITRITE, LEUKOCYTESUR, BILIRUBINUR, UROBILINOGEN, RBCUA, WBCUA, BACTERIA, UACOMMENT  No results found for: TROPONINT, TROPONINI, BNP  No components found for: HGBA1C;2  No components found for: TSH;2    Imaging Results (all)     Procedure Component Value Units Date/Time    FL Guided Aspiration Joint [[id number]] Collected:  08/24/18 1640     Updated:  08/24/18 1646    Narrative:       FLUOROSCOPICALLY GUIDED LEFT KNEE ARTHROCENTESIS     HISTORY: Left knee and leg pain and swelling. Possible septic arthritis.        FLUOROSCOPY TIME: One second.  Two images were saved.     TECHNIQUE: I discussed the risks and benefits of this procedure with the  patient  before starting. I answered his questions and he agreed to  proceed.     The anterior aspect of the left knee was prepped and draped in the usual  sterile fashion. I anesthetized the soft tissue medial to the  patellofemoral space with about 4 mL of 1% lidocaine and advanced a  23-gauge needle into the patellofemoral space. I was not able to  aspirate any fluid. I switched to a 22-gauge needle which was again  advanced into the joint. No fluid could be withdrawn. I then injected  less than 1 mL of Isovue-300 which flowed readily into the joint space  without any evidence of dilution whatsoever. The needle was removed. The  patient tolerated the procedure well.       Impression:       There is no left knee effusion. No fluid was obtained.     This report was finalized on 8/24/2018 4:42 PM by Dr. Omar Foster M.D.       XR Knee 1 or 2 View Left [[id number]] Collected:  08/24/18 1517     Updated:  08/24/18 1602    Narrative:       X-RAY KNEE 1 OR 2 VIEWS LEFT     CLINICAL INFORMATION: Pain and knee effusion.      FINDINGS: There is narrowing of the medial compartment. There is  patellar spur formation. There is no indication of fracture or  dislocation nor bone lesion. No significant joint effusion identified  radiographically. There is prominence of the anterior tibial tubercle.  No soft tissue gas nor radiopaque foreign body demonstrated. The  remainder is unremarkable. Cross-sectional imaging if further evaluation  is warranted clinically.     This report was finalized on 8/24/2018 3:58 PM by Dr. Adin Carter M.D.         No results found for: SITE, ALLENTEST, PHART, OKS6JAM, PO2ART, WEB2QCZ, BASEEXCESS, V4DPVPKG, HGBBG, HCTABG, OXYHEMOGLOBI, METHHGBN, CARBOXYHGB, CO2CT, BAROMETRIC, MODALITY, FIO2       Discharge Medications      New Medications      Instructions Start Date   amoxicillin-clavulanate 875-125 MG per tablet  Commonly known as:  AUGMENTIN   1 tablet, Oral, Every 12 Hours Scheduled      doxycycline  100 MG capsule  Commonly known as:  MONODOX   100 mg, Oral, Every 12 Hours Scheduled         Continue These Medications      Instructions Start Date   amLODIPine 10 MG tablet  Commonly known as:  NORVASC   10 mg, Oral, Daily      atenolol 25 MG tablet  Commonly known as:  TENORMIN   25 mg, Oral, Daily      gabapentin 600 MG tablet  Commonly known as:  NEURONTIN   600 mg, Oral, 3 Times Daily      omeprazole 20 MG capsule  Commonly known as:  priLOSEC   20 mg, Oral, Daily         Stop These Medications    hydrochlorothiazide 12.5 MG capsule  Commonly known as:  MICROZIDE     ondansetron 4 MG tablet  Commonly known as:  ZOFRAN     oxyCODONE-acetaminophen 7.5-325 MG per tablet  Commonly known as:  PERCOCET     potassium chloride 10 MEQ CR capsule  Commonly known as:  MICRO-K              Patient Instructions:       No future appointments.     Follow-up Information     Provider, No Known .    Contact information:  [place] [id number] [phone]                   Discharge Order     Start     Ordered    08/24/18 1858  Discharge patient  Once     Expected Discharge Date:  08/24/18    Discharge Disposition:  Home or Self Care    Physician of Record for Attribution - Please select from Treatment Team:  EMILIANO HARPER [[id number]]    Review needed by CMO to determine Physician of Record:  No       Question Answer Comment   Physician of Record for Attribution - Please select from Treatment Team EMILIANO HARPER    Review needed by CMO to determine Physician of Record No        08/24/18 1857        F/u with PMD 1-2 week for borderline Diabetes      TEST  RESULTS PENDING AT DISCHARGE   Order Current Status    Blood Culture - Blood, Blood, Venous Line Preliminary result    Blood Culture - Blood, Blood, Venous Line Preliminary result            Total time spent discharging patient including evaluation, post hospitalization follow up,  medication and post hospitalization instructions and education, total  time exceeds 30 minutes.    Signed:  Clarence Adam MD  8/24/2018  6:58 PM

## 2018-08-24 NOTE — PLAN OF CARE
Problem: Patient Care Overview  Goal: Plan of Care Review  Outcome: Ongoing (interventions implemented as appropriate)   08/24/18 9189   Coping/Psychosocial   Plan of Care Reviewed With patient   Plan of Care Review   Progress improving   OTHER   Outcome Summary Pt has had no c/o pain. up ad gaby. No PRN medicaitons given thus far this shift. Ortho consult. X rays to Lleg/knee done. Attempted aspiration of knee in Xray , no fluid to drain per radiologist. COntinue IV antibiotics. NS 20K @ 100. Will continue to monitor.      Goal: Individualization and Mutuality  Outcome: Ongoing (interventions implemented as appropriate)    Goal: Discharge Needs Assessment  Outcome: Ongoing (interventions implemented as appropriate)    Goal: Interprofessional Rounds/Family Conf  Outcome: Ongoing (interventions implemented as appropriate)      Problem: Pain, Acute (Adult)  Goal: Acceptable Pain Control/Comfort Level  Outcome: Ongoing (interventions implemented as appropriate)      Problem: Skin and Soft Tissue Infection (Adult)  Goal: Signs and Symptoms of Listed Potential Problems Will be Absent, Minimized or Managed (Skin and Soft Tissue Infection)  Outcome: Ongoing (interventions implemented as appropriate)
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
## 2018-08-24 NOTE — PROGRESS NOTES
"Pharmacokinetic Evaluation - Vancomycin    Kike Prajapati is a 53 y.o. male on vancomycin pharmacy to dose.  MRN: [id number]  : 1965    Day of vancomycin therapy: 2  Indication: SSTI  Consulted by: Dr. Ley  Goal trough: 15-20mg/L (possible joint infection until r/o)    Other antimicrobials: CTX 2g q24h    Blood pressure 130/75, pulse 84, temperature 97.7 °F (36.5 °C), temperature source Oral, resp. rate 16, height 180.3 cm (70.98\"), weight 92.9 kg (204 lb 12.8 oz), SpO2 98 %.    Results from last 7 days  Lab Units 18  0557 18  0549 18  2325   CREATININE mg/dL 1.21 1.19 1.38*     Estimated Creatinine Clearance: 83.1 mL/min (by C-G formula based on SCr of 1.21 mg/dL).    Results from last 7 days  Lab Units 18  0557 18  0549 18  2325   WBC 10*3/mm3 8.69 11.66* 12.76*   HEMOGLOBIN g/dL 14.8 14.5 14.5   HEMATOCRIT % 45.0 44.8 43.1   PLATELETS 10*3/mm3 212 189 188     Other relevant labs/chart info: CRP: 22.4; from H&P- knee is tender and plan to consult ortho to r/o possibility of septic joint    Cultures:    bcx: NGTD  Synovial aspirate from knee pending    Dosing hx (include troughs if drawn):   Vancomycin 1750mg loading dose at 2342   Vancomycin 1750 q12 continued at 1109  : Trough = 15.7 at 1018     Assessment:  Pt with improved renal function, and current therapeutic level. Pt still not at steady state however, and concern that he could reach supratherapeutic levels if dose not adjusted. Will decrease dose to 1500 q12 and then re-assess levels on  at 1030  Plan:  1) Decrease vancomycin to 1500 mg every 12 hours.  2) Next trough on  at 1030 after 3 total doses of new regimen  3) Monitor for s/sxns of vancomycin toxicity including changes in UOP/SCr; encourage hydration as tolerated to decrease risk of toxic accumulation    Radha Freire, Pharm.D., [facility]  Oncology Pharmacy Specialist  [phone]      "

## 2018-08-24 NOTE — PROGRESS NOTES
[facility]     LOS: 1 day     Subjective:  No cp, no soa, no n/v  Leg is much better    Objective:    Vital Signs:  Temp:  [97.7 °F (36.5 °C)-98.5 °F (36.9 °C)] 98.1 °F (36.7 °C)  Heart Rate:  [68-84] 68  Resp:  [16-18] 16  BP: (130-153)/(75-90) 143/86    SpO2:  [98 %-99 %] 99 %  on   ;   Device (Oxygen Therapy): room air  Body mass index is 28.58 kg/m².    Physical Exam   Constitutional: He is oriented to person, place, and time. He appears well-developed and well-nourished.   Eyes: Conjunctivae and EOM are normal.   Pulmonary/Chest: Effort normal.   Musculoskeletal: Normal range of motion.   Minimal tenderness   Neurological: He is alert and oriented to person, place, and time.   Skin: Skin is warm and dry.       Results Review:    Glucose   Date Value Ref Range Status   08/24/2018 140 (H) 65 - 99 mg/dL Final   08/23/2018 158 (H) 65 - 99 mg/dL Final   08/22/2018 128 (H) 65 - 99 mg/dL Final       Results from last 7 days  Lab Units 08/24/18  0557   WBC 10*3/mm3 8.69   HEMOGLOBIN g/dL 14.8   HEMATOCRIT % 45.0   PLATELETS 10*3/mm3 212       Results from last 7 days  Lab Units 08/24/18  0557  08/22/18  2325   SODIUM mmol/L 143  < > 138   POTASSIUM mmol/L 3.9  < > 3.2*   CHLORIDE mmol/L 105  < > 100   CO2 mmol/L 26.9  < > 29.5*   BUN mg/dL 9  < > 16   CREATININE mg/dL 1.21  < > 1.38*   CALCIUM mg/dL 9.0  < > 9.4   BILIRUBIN mg/dL  --   --  0.7   ALK PHOS U/L  --   --  84   ALT (SGPT) U/L  --   --  26   AST (SGOT) U/L  --   --  20   GLUCOSE mg/dL 140*  < > 128*   < > = values in this interval not displayed.              Cultures:  Blood Culture   Date Value Ref Range Status   08/22/2018 No growth at 24 hours  Preliminary   08/22/2018 No growth at 24 hours  Preliminary       I have reviewed daily medications and changes in CPOE    Scheduled meds    amLODIPine 10 mg Oral Daily   atenolol 25 mg Oral Daily   ceftriaxone 2 g Intravenous Q24H   enoxaparin 40 mg Subcutaneous Q24H   gabapentin 600  mg Oral Q8H   pantoprazole 40 mg Oral Q AM   vancomycin 1,500 mg Intravenous Q12H         Pharmacy to dose vancomycin     sodium chloride 0.9 % with KCl 20 mEq 100 mL/hr Last Rate: 100 mL/hr (08/24/18 0623)     PRN meds  •  acetaminophen  •  bisacodyl  •  calcium carbonate  •  HYDROcodone-acetaminophen  •  ondansetron **OR** ondansetron ODT **OR** ondansetron  •  oxyCODONE-acetaminophen  •  Pharmacy to dose vancomycin  •  potassium chloride **OR** potassium chloride **OR** potassium chloride  •  sodium chloride      Active Problems:    Cellulitis of left leg    Essential hypertension    Sepsis (CMS/HCC)    Hypokalemia    Chronic back pain    Hyperglycemia        Assessment/Plan:      Cellulitis of left leg  -d/w dr Harvey       Essential hypertension  -Bp is ok      Sepsis (CMS/HCC)      Hypokalemia  -k is 3.9      Chronic back pain      Hyperglycemia      Clarence Adam MD  08/24/18  6:13 PM

## 2018-08-24 NOTE — CONSULTS
CONSULT NOTE    Infectious Diseases - Catarino Rosales MD  [facility]       Patient Identification:  Name: Kike Prajapati  Age: 53 y.o.  Sex: male  :  1965  MRN: [id number]             Date of Consultation:  2018       Primary Care Physician: Provider, No Known                               Requesting Physician: Dr. Adam  Reason for Consultation: Cellulitis of the left leg recommendation regarding antibiotic therapy    Impression: 53-year-old male with past medical history remarkable for hypertension chronic back pain and significant calluses on his ankle and knees from prostration during prayers presents with 2 days history of increasing redness and pain and discomfort on 2018 with workup revealing leukocytosis and fever and elevated lactic acid level.  Patient was empirically started on vancomycin and Rocephin with improvement in clinical status and sense of well-being with resolution of inflammatory markers.  CULTURES have been negative.  This presentation is consistent with:  1-cellulitis of the left lower extremity due to skin bogdan exacerbated by callus damage to his knee and ankle.  Likely pathogen to consider strep and staph species.  2-hypertension  3-chronic back pain   4-no evidence of septic arthritis      Recommendations/Discussions: He is improved significantly and his antibiotics can be changed to oral Augmentin and doxycycline to cover for most common bogdan that causes cellulitis in the situation.  It is imperative that he cover strep and staph species including MRSA and would recommend total of 10 days of antibiotic therapy counting what he has received in this hospital.  Patient is educated to use Hibiclens whole-body wash once a week to decrease bacterial burden and follow-up with his primary care physician if he develops fever diarrhea abdominal cramps while receiving oral antibiotics for his cellulitis.  He can be discharged from infectious disease standpoint  anytime.  Overall care plan discussed with Dr. Adam.        History of Present Illness:   3-year-old male with past medical history remarkable for hypertension chronic back pain and significant calluses on his ankle and knees from prostration during prayers presents with 2 days history of increasing redness and pain and discomfort on 8/22/2018 with workup revealing leukocytosis and fever and elevated lactic acid level.  Patient was empirically started on vancomycin and Rocephin with improvement in clinical status and sense of well-being with resolution of inflammatory markers.  CULTURES have been negative.  Patient denies any specific trauma or injury.  Patient underwent attempted aspiration of the left knee joint with no joint fluid noted.  Patient has no significant pain and discomfort on the knee joint      Past Medical History:  Past Medical History:   Diagnosis Date   • Asthma    • Hypertension      Past Surgical History:  Past Surgical History:   Procedure Laterality Date   • HERNIA REPAIR        Home Meds:  Prescriptions Prior to Admission   Medication Sig Dispense Refill Last Dose   • amLODIPine (NORVASC) 10 MG tablet Take 10 mg by mouth Daily.   8/22/2018 at Unknown time   • atenolol (TENORMIN) 25 MG tablet Take 25 mg by mouth Daily.   8/22/2018 at Unknown time   • gabapentin (NEURONTIN) 600 MG tablet Take 600 mg by mouth 3 (Three) Times a Day.   8/22/2018 at Unknown time   • hydrochlorothiazide (MICROZIDE) 12.5 MG capsule Take 12.5 mg by mouth Daily.   8/22/2018 at Unknown time   • omeprazole (priLOSEC) 20 MG capsule Take 20 mg by mouth Daily.   8/22/2018 at Unknown time   • ondansetron (ZOFRAN) 4 MG tablet Take 4 mg by mouth Every 8 (Eight) Hours As Needed for Nausea or Vomiting.   Past Week at Unknown time   • oxyCODONE-acetaminophen (PERCOCET) 7.5-325 MG per tablet Take 1 tablet by mouth Every 6 (Six) Hours As Needed.   8/22/2018 at Unknown time   • potassium chloride (MICRO-K) 10 MEQ CR capsule Take  10 mEq by mouth 1 (One) Time.   8/22/2018 at Unknown time     Current Meds:     Current Facility-Administered Medications:   •  acetaminophen (TYLENOL) tablet 650 mg, 650 mg, Oral, Q4H PRN, Gene Ley MD  •  amLODIPine (NORVASC) tablet 10 mg, 10 mg, Oral, Daily, Gene Ley MD, 10 mg at 08/24/18 0903  •  atenolol (TENORMIN) tablet 25 mg, 25 mg, Oral, Daily, Gene Ley MD, 25 mg at 08/24/18 0903  •  bisacodyl (DULCOLAX) EC tablet 5 mg, 5 mg, Oral, Daily PRN, Gene Ley MD  •  calcium carbonate (TUMS) chewable tablet 500 mg (200 mg elemental), 2 tablet, Oral, BID PRN, Gene Ley MD  •  cefTRIAXone (ROCEPHIN) IVPB 2 g, 2 g, Intravenous, Q24H, Gene Ley MD, Last Rate: 100 mL/hr at 08/24/18 1002, 2 g at 08/24/18 1002  •  enoxaparin (LOVENOX) syringe 40 mg, 40 mg, Subcutaneous, Q24H, Gene Ley MD  •  gabapentin (NEURONTIN) capsule 600 mg, 600 mg, Oral, Q8H, Gene Ley MD, 600 mg at 08/24/18 1459  •  HYDROcodone-acetaminophen (NORCO) 5-325 MG per tablet 1 tablet, 1 tablet, Oral, Q4H PRN, Gene Ley MD, 1 tablet at 08/23/18 0642  •  ondansetron (ZOFRAN) tablet 4 mg, 4 mg, Oral, Q6H PRN **OR** ondansetron ODT (ZOFRAN-ODT) disintegrating tablet 4 mg, 4 mg, Oral, Q6H PRN **OR** ondansetron (ZOFRAN) injection 4 mg, 4 mg, Intravenous, Q6H PRN, Gene Ley MD  •  oxyCODONE-acetaminophen (PERCOCET) 7.5-325 MG per tablet 1 tablet, 1 tablet, Oral, Q6H PRN, Gene Ley MD  •  pantoprazole (PROTONIX) EC tablet 40 mg, 40 mg, Oral, Q AM, Gene Ley MD, 40 mg at 08/24/18 0623  •  Pharmacy to dose vancomycin, , Does not apply, Continuous PRN, Gene Ley MD  •  potassium chloride (MICRO-K) CR capsule 40 mEq, 40 mEq, Oral, PRN, 40 mEq at 08/23/18 0642 **OR** potassium chloride (KLOR-CON) packet 40 mEq, 40 mEq, Oral, PRN **OR** potassium chloride 10 mEq in 100 mL IVPB, 10 mEq, Intravenous, Q1H PRN, Gene Ley MD  •  sodium chloride  "0.9 % flush 1-10 mL, 1-10 mL, Intravenous, PRN, Gene Ley MD  •  sodium chloride 0.9 % with KCl 20 mEq/L infusion, 100 mL/hr, Intravenous, Continuous, Gene Ley MD, Last Rate: 100 mL/hr at 08/24/18 0623, 100 mL/hr at 08/24/18 0623  •  vancomycin 1500 mg/500 mL 0.9% NS IVPB ([facility]), 1,500 mg, Intravenous, Q12H, Clarence Adam MD  Allergies:  No Known Allergies  Social History:   Social History   Substance Use Topics   • Smoking status: Former Smoker     Types: Cigarettes   • Smokeless tobacco: Not on file   • Alcohol use No      Family History:  History reviewed. No pertinent family history.       Review of Systems  See history of present illness and past medical history.   Feeling much better  At the time of admission his review system was as follows  Constitutional: Positive for chills and fever.   HENT: Negative for congestion, nosebleeds, sore throat and trouble swallowing.    Eyes: Negative for redness and visual disturbance.   Respiratory: Negative for cough, chest tightness, shortness of breath and wheezing.    Cardiovascular: Negative for chest pain and palpitations.   Gastrointestinal: Negative for abdominal pain, blood in stool, constipation, diarrhea, nausea and vomiting.   Endocrine: Negative for cold intolerance and heat intolerance.   Genitourinary: Negative for difficulty urinating, dysuria and hematuria.   Musculoskeletal: Positive for back pain (chronic, unchanged). Negative for arthralgias and myalgias.   Skin: Positive for rash (LLE). Negative for pallor.   Allergic/Immunologic: Negative for immunocompromised state.   Neurological: Negative for dizziness, weakness, light-headedness and headaches.   Hematological: Positive for adenopathy (left groin). Does not bruise/bleed easily.   Vitals:   /86 (BP Location: Right arm, Patient Position: Sitting)   Pulse 68   Temp 98.1 °F (36.7 °C) (Oral)   Resp 16   Ht 180.3 cm (70.98\")   Wt 92.9 kg (204 lb 12.8 oz)   SpO2 99%   " BMI 28.58 kg/m²   I/O:   Intake/Output Summary (Last 24 hours) at 08/24/18 1837  Last data filed at 08/24/18 1822   Gross per 24 hour   Intake          2471.67 ml   Output              300 ml   Net          2171.67 ml     Exam:  General Appearance:    Alert, cooperative, no distress, appears stated age   Head:    Normocephalic, without obvious abnormality, atraumatic   Eyes:    PERRL, conjunctiva/corneas clear, EOM's intact, both eyes   Ears:    Normal external ear canals, both ears   Nose:   Nares normal, septum midline, mucosa normal, no drainage    or sinus tenderness   Throat:   Lips, tongue, gums normal; oral mucosa pink and moist   Neck:   Supple, symmetrical, trachea midline, no adenopathy;     thyroid:  no enlargement/tenderness/nodules; no carotid    bruit or JVD   Back:     Symmetric, no curvature, ROM normal, no CVA tenderness   Lungs:     Clear to auscultation bilaterally, respirations unlabored   Chest Wall:    No tenderness or deformity    Heart:    Regular rate and rhythm, S1 and S2 normal, no murmur, rub   or gallop   Abdomen:     Soft, non-tender, bowel sounds active all four quadrants,     no masses, no hepatomegaly, no splenomegaly   Extremities:   Extremity warmth and tenderness is much improved there is minimal erythema on the medial aspect of the leg in the mid shin area with no obvious fluctuation or significant tenderness.  His knee joint function and ankle joint function is intact he has significant calluses present on his knees bilaterally  due to    Pulses:   Pulses palpable in all extremities; symmetric all extremities   Skin:   Skin color normal, Skin is warm and dry,  no rashes or palpable lesions   Neurologic:   CNII-XII intact, motor strength grossly intact, sensation grossly intact to light touch, no focal deficits noted       Data Review:    I reviewed the patient's new clinical results.    Results from last 7 days  Lab Units 08/24/18  0557 08/23/18  0549 08/22/18  8695   WBC  10*3/mm3 8.69 11.66* 12.76*   HEMOGLOBIN g/dL 14.8 14.5 14.5   PLATELETS 10*3/mm3 212 189 188       Results from last 7 days  Lab Units 08/24/18  0557 08/23/18  0549 08/22/18 2325   SODIUM mmol/L 143 139 138   POTASSIUM mmol/L 3.9 3.2* 3.2*   CHLORIDE mmol/L 105 104 100   CO2 mmol/L 26.9 25.9 29.5*   BUN mg/dL 9 12 16   CREATININE mg/dL 1.21 1.19 1.38*   CALCIUM mg/dL 9.0 8.3* 9.4   GLUCOSE mg/dL 140* 158* 128*     Microbiology Results (last 10 days)     Procedure Component Value - Date/Time    Blood Culture - Blood, Blood, Venous Line [[id number]]  (Normal) Collected:  08/22/18 2326    Lab Status:  Preliminary result Specimen:  Blood from Arm, Left Updated:  08/23/18 2345     Blood Culture No growth at 24 hours    Blood Culture - Blood, Blood, Venous Line [[id number]]  (Normal) Collected:  08/22/18 2325    Lab Status:  Preliminary result Specimen:  Blood from Arm, Left Updated:  08/23/18 2345     Blood Culture No growth at 24 hours            Assessment:  Active Hospital Problems    Diagnosis Date Noted   • Cellulitis of left leg [L03.116] 08/23/2018   • Essential hypertension [I10] 08/23/2018   • Sepsis (CMS/HCC) [A41.9] 08/23/2018   • Hypokalemia [E87.6] 08/23/2018   • Chronic back pain [M54.9, G89.29] 08/23/2018   • Hyperglycemia [R73.9] 08/23/2018      Resolved Hospital Problems    Diagnosis Date Noted Date Resolved   No resolved problems to display.         Plan:  See above.  Catarino Harvey MD   8/24/2018  6:37 PM    Much of this encounter note is an electronic transcription/translation of spoken language to printed text. The electronic translation of spoken language may permit erroneous, or at times, nonsensical words or phrases to be inadvertently transcribed; Although I have reviewed the note for such errors, some may still exist

## 2018-08-27 LAB
BACTERIA SPEC AEROBE CULT: NORMAL
BACTERIA SPEC AEROBE CULT: NORMAL
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
